# Patient Record
Sex: MALE | Race: WHITE
[De-identification: names, ages, dates, MRNs, and addresses within clinical notes are randomized per-mention and may not be internally consistent; named-entity substitution may affect disease eponyms.]

---
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
## 2021-01-20 ENCOUNTER — EMERGENCY (EMERGENCY)
Facility: HOSPITAL | Age: 21
LOS: 0 days | Discharge: HOME | End: 2021-01-20
Attending: PEDIATRICS | Admitting: PEDIATRICS
Payer: COMMERCIAL

## 2021-01-20 VITALS
SYSTOLIC BLOOD PRESSURE: 121 MMHG | DIASTOLIC BLOOD PRESSURE: 87 MMHG | TEMPERATURE: 99 F | RESPIRATION RATE: 20 BRPM | OXYGEN SATURATION: 88 % | HEART RATE: 78 BPM

## 2021-01-20 VITALS
HEART RATE: 108 BPM | RESPIRATION RATE: 18 BRPM | OXYGEN SATURATION: 100 % | DIASTOLIC BLOOD PRESSURE: 74 MMHG | TEMPERATURE: 209 F | SYSTOLIC BLOOD PRESSURE: 137 MMHG
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
## 2021-01-20 DIAGNOSIS — S09.90XA UNSPECIFIED INJURY OF HEAD, INITIAL ENCOUNTER: ICD-10-CM

## 2021-01-20 DIAGNOSIS — Z88.0 ALLERGY STATUS TO PENICILLIN: ICD-10-CM
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
## 2021-01-20 PROCEDURE — 99283 EMERGENCY DEPT VISIT LOW MDM: CPT

## 2021-01-20 RX ORDER — IBUPROFEN 200 MG
400 TABLET ORAL ONCE
Refills: 0 | Status: COMPLETED | OUTPATIENT
Start: 2021-01-20 | End: 2021-01-20

## 2021-01-20 NOTE — ED ADULT NURSE NOTE - PRO INTERPRETER NEED 2
Pt is a 45 y.o  male admitted with Acute Diverticulitis. Pt was alert, oriented and verified his demographic information and all is correct. Pt lives with his wife in a 1 story home with 3 steps to the entrance. Prior to admission, pt was independent with his ADL's and IADL's and drives. Pt stated he drove to the hospital from work. Pt stated he recently started a job and his insurance did not start yet. CM will contact [facility] regarding the care card. Pt stated his PCP retired. CM offered the [facility] PCP list. Denies using DME or having home health or rehab in the past. Preferred pharmacy is [facility].   CM will continue to follow for discharge planning needs. Care Management Interventions  PCP Verified by CM: Yes (doesn't have a PCP now )  Mode of Transport at Discharge:  Other (see comment) (friend or family )  Transition of Care Consult (CM Consult): Discharge Planning  Discharge Durable Medical Equipment: No  Physical Therapy Consult: No  Occupational Therapy Consult: No  Speech Therapy Consult: No  Current Support Network: Own Home, Lives with Spouse (lives in a 1 story home with 3 steps to the entrance)  Confirm Follow Up Transport: Family  Discharge Location  Discharge Placement: [address]

## 2021-01-20 NOTE — ED PROVIDER NOTE - PATIENT PORTAL LINK FT
You can access the FollowMyHealth Patient Portal offered by [facility] by registering at the following website: [email or web address]. By joining [employer]’s FollowMyHealth portal, you will also be able to view your health information using other applications (apps) compatible with our system.

## 2021-01-20 NOTE — ED PROVIDER NOTE - NS ED ROS FT
Eyes:  No visual changes, eye pain or discharge.  ENMT:  No hearing changes, pain, discharge or infections. No neck pain or stiffness. +head pain  Cardiac:  No chest pain, SOB or edema. No chest pain with exertion.  Respiratory:  No cough or respiratory distress. No hemoptysis. No history of asthma or RAD.  GI:  No nausea, vomiting, diarrhea or abdominal pain.  :  No dysuria, frequency or burning.  MS:  No myalgia, muscle weakness, joint pain or back pain.  Neuro:  No headache or weakness.  No LOC.  Skin:  No skin rash.

## 2021-01-20 NOTE — ED PROVIDER NOTE - PHYSICAL EXAMINATION
CONSTITUTIONAL: Well-developed; well-nourished; in no acute distress.   SKIN: warm, dry  HEAD: Normocephalic; no open wounds or laceration visualized, no visible hematoma, no skull depressions.  EYES: PERRL, EOMI, no conjunctival erythema  ENT: No nasal discharge; airway clear.  NECK: Supple; non tender.  CARD: S1, S2 normal; no murmurs, gallops, or rubs. Regular rate and rhythm.   RESP: No wheezes, rales or rhonchi.  ABD: soft ntnd  EXT: Normal ROM.  No clubbing, cyanosis or edema.   LYMPH: No acute cervical adenopathy.  NEURO: Alert, oriented, grossly unremarkable  PSYCH: Cooperative, appropriate.

## 2021-01-20 NOTE — ED PROVIDER NOTE - OBJECTIVE STATEMENT
19 y/o m with no pmh presenting for head injury. Per patient, he was walking down set up stairs when he fell and hit the L side of his head against railing. Occurred at 1pm today. Denies LOC, no n/v, no dizziness, no confusion, able to ambulate normally afterwards. Now with throbbing pain to the area since then.

## 2021-01-20 NOTE — ED PROVIDER NOTE - ATTENDING CONTRIBUTION TO CARE
I personally evaluated the patient. I reviewed the Resident’s note (as assigned above), and agree with the findings and plan except as documented in my note.  ~ 19 y/o male here for eval of headache after fall , no loc went to urgi but when told 2 hr wait came here ; injury occurred x 6.5 hrs ago no n/v   Gen: Alert, NAD, sitting comfortably in stretcher  Head: NC, AT, PERRL, EOMI, normal lids/conjunctiva  ENT: B TM WNL, patent oropharynx without erythema/exudate, uvula midline  Neck: +supple, no tenderness/meningismus/JVD, +Trachea midline  Pulm: Bilateral BS, normal resp effort, no wheeze/stridor/retractions  CV: RRR, no M/R/G, +dist pulses  Abd: soft, NT/ND, +BS, no hepatosplenomegaly  Mskel: no edema/erythema/cyanosis  Skin: no rash  Neuro: grossly intact

## 2022-02-03 ENCOUNTER — APPOINTMENT (OUTPATIENT)
Dept: OTOLARYNGOLOGY | Facility: CLINIC | Age: 22
End: 2022-02-03
Payer: COMMERCIAL

## 2022-02-03 VITALS — BODY MASS INDEX: 19.29 KG/M2 | HEIGHT: 66 IN | WEIGHT: 120 LBS

## 2022-02-03 PROBLEM — Z00.00 ENCOUNTER FOR PREVENTIVE HEALTH EXAMINATION: Status: ACTIVE | Noted: 2022-02-03

## 2022-02-03 PROCEDURE — 99203 OFFICE O/P NEW LOW 30 MIN: CPT

## 2022-02-03 NOTE — HISTORY OF PRESENT ILLNESS
[de-identified] : Patient presents today c/o  epidermal  cyst back of right ear .   Has a  cyst   behind   ear since November 2021 . Has reduced in size  . Has no pain at site .  Was seen  by derm and referred to Dr. Moncada. FNA revealed epidermal cyst. referred to our office for further management.

## 2022-02-03 NOTE — PHYSICAL EXAM
[Midline] : trachea located in midline position [de-identified] : palpable mass behind right auricle soft non tender approx 7 mm in size.  [Normal] : cranial nerves 2-12 intact

## 2022-02-03 NOTE — REASON FOR VISIT
[Initial Evaluation] : an initial evaluation for [FreeTextEntry2] : epidermal  cyst back of right ear

## 2022-05-19 ENCOUNTER — APPOINTMENT (OUTPATIENT)
Dept: OTOLARYNGOLOGY | Facility: CLINIC | Age: 22
End: 2022-05-19
Payer: COMMERCIAL

## 2022-05-19 DIAGNOSIS — H61.90 DISORDER OF EXTERNAL EAR, UNSPECIFIED, UNSPECIFIED EAR: ICD-10-CM

## 2022-05-19 PROCEDURE — 99212 OFFICE O/P EST SF 10 MIN: CPT

## 2022-05-19 NOTE — PHYSICAL EXAM
[Normal] : mucosa is normal [Midline] : trachea located in midline position [de-identified] : palpable mass behind right auricle soft non tender approx 5 mm in size.

## 2022-05-19 NOTE — HISTORY OF PRESENT ILLNESS
[FreeTextEntry1] : Patient returns today following up on lesion of external ear . Feels like cyst has  got  smaller , but still feels in in ear . No pain or  discomfort.

## 2022-11-14 ENCOUNTER — EMERGENCY (EMERGENCY)
Facility: HOSPITAL | Age: 22
LOS: 0 days | Discharge: HOME | End: 2022-11-15
Attending: EMERGENCY MEDICINE | Admitting: EMERGENCY MEDICINE

## 2022-11-14 VITALS
OXYGEN SATURATION: 100 % | SYSTOLIC BLOOD PRESSURE: 116 MMHG | HEART RATE: 93 BPM | RESPIRATION RATE: 18 BRPM | TEMPERATURE: 98 F | DIASTOLIC BLOOD PRESSURE: 68 MMHG

## 2022-11-14 VITALS
DIASTOLIC BLOOD PRESSURE: 78 MMHG | WEIGHT: 119.93 LBS | SYSTOLIC BLOOD PRESSURE: 167 MMHG | OXYGEN SATURATION: 99 % | TEMPERATURE: 98 F | RESPIRATION RATE: 18 BRPM | HEART RATE: 107 BPM
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
## 2022-11-14 DIAGNOSIS — R10.12 LEFT UPPER QUADRANT PAIN: ICD-10-CM
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
## 2022-11-14 DIAGNOSIS — Z88.0 ALLERGY STATUS TO PENICILLIN: ICD-10-CM
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
## 2022-11-14 DIAGNOSIS — R00.0 TACHYCARDIA, UNSPECIFIED: ICD-10-CM
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
## 2022-11-14 LAB
ALBUMIN SERPL ELPH-MCNC: 5.6 G/DL — HIGH (ref 3.5–5.2)
ALP SERPL-CCNC: 71 U/L — SIGNIFICANT CHANGE UP (ref 30–115)
ALT FLD-CCNC: 18 U/L — SIGNIFICANT CHANGE UP (ref 0–41)
ANION GAP SERPL CALC-SCNC: 8 MMOL/L — SIGNIFICANT CHANGE UP (ref 7–14)
APPEARANCE UR: CLEAR — SIGNIFICANT CHANGE UP
AST SERPL-CCNC: 21 U/L — SIGNIFICANT CHANGE UP (ref 0–41)
BASOPHILS # BLD AUTO: 0.03 K/UL — SIGNIFICANT CHANGE UP (ref 0–0.2)
BASOPHILS NFR BLD AUTO: 0.3 % — SIGNIFICANT CHANGE UP (ref 0–1)
BILIRUB SERPL-MCNC: 0.5 MG/DL — SIGNIFICANT CHANGE UP (ref 0.2–1.2)
BILIRUB UR-MCNC: NEGATIVE — SIGNIFICANT CHANGE UP
BUN SERPL-MCNC: 11 MG/DL — SIGNIFICANT CHANGE UP (ref 10–20)
CALCIUM SERPL-MCNC: 9.9 MG/DL — SIGNIFICANT CHANGE UP (ref 8.4–10.5)
CHLORIDE SERPL-SCNC: 99 MMOL/L — SIGNIFICANT CHANGE UP (ref 98–110)
CO2 SERPL-SCNC: 31 MMOL/L — SIGNIFICANT CHANGE UP (ref 17–32)
COLOR SPEC: COLORLESS — SIGNIFICANT CHANGE UP
CREAT SERPL-MCNC: 0.8 MG/DL — SIGNIFICANT CHANGE UP (ref 0.7–1.5)
DIFF PNL FLD: NEGATIVE — SIGNIFICANT CHANGE UP
EGFR: 128 ML/MIN/1.73M2 — SIGNIFICANT CHANGE UP
EOSINOPHIL # BLD AUTO: 0.01 K/UL — SIGNIFICANT CHANGE UP (ref 0–0.7)
EOSINOPHIL NFR BLD AUTO: 0.1 % — SIGNIFICANT CHANGE UP (ref 0–8)
GLUCOSE SERPL-MCNC: 99 MG/DL — SIGNIFICANT CHANGE UP (ref 70–99)
GLUCOSE UR QL: NEGATIVE — SIGNIFICANT CHANGE UP
HCT VFR BLD CALC: 49.8 % — SIGNIFICANT CHANGE UP (ref 42–52)
HGB BLD-MCNC: 16.8 G/DL — SIGNIFICANT CHANGE UP (ref 14–18)
IMM GRANULOCYTES NFR BLD AUTO: 0.2 % — SIGNIFICANT CHANGE UP (ref 0.1–0.3)
KETONES UR-MCNC: NEGATIVE — SIGNIFICANT CHANGE UP
LEUKOCYTE ESTERASE UR-ACNC: NEGATIVE — SIGNIFICANT CHANGE UP
LIDOCAIN IGE QN: 24 U/L — SIGNIFICANT CHANGE UP (ref 7–60)
LYMPHOCYTES # BLD AUTO: 1.84 K/UL — SIGNIFICANT CHANGE UP (ref 1.2–3.4)
LYMPHOCYTES # BLD AUTO: 19.2 % — LOW (ref 20.5–51.1)
MCHC RBC-ENTMCNC: 27.7 PG — SIGNIFICANT CHANGE UP (ref 27–31)
MCHC RBC-ENTMCNC: 33.7 G/DL — SIGNIFICANT CHANGE UP (ref 32–37)
MCV RBC AUTO: 82.2 FL — SIGNIFICANT CHANGE UP (ref 80–94)
MONOCYTES # BLD AUTO: 0.8 K/UL — HIGH (ref 0.1–0.6)
MONOCYTES NFR BLD AUTO: 8.4 % — SIGNIFICANT CHANGE UP (ref 1.7–9.3)
NEUTROPHILS # BLD AUTO: 6.86 K/UL — HIGH (ref 1.4–6.5)
NEUTROPHILS NFR BLD AUTO: 71.8 % — SIGNIFICANT CHANGE UP (ref 42.2–75.2)
NITRITE UR-MCNC: NEGATIVE — SIGNIFICANT CHANGE UP
NRBC # BLD: 0 /100 WBCS — SIGNIFICANT CHANGE UP (ref 0–0)
PH UR: 8 — SIGNIFICANT CHANGE UP (ref 5–8)
PLATELET # BLD AUTO: 207 K/UL — SIGNIFICANT CHANGE UP (ref 130–400)
POTASSIUM SERPL-MCNC: 4.4 MMOL/L — SIGNIFICANT CHANGE UP (ref 3.5–5)
POTASSIUM SERPL-SCNC: 4.4 MMOL/L — SIGNIFICANT CHANGE UP (ref 3.5–5)
PROT SERPL-MCNC: 7.6 G/DL — SIGNIFICANT CHANGE UP (ref 6–8)
PROT UR-MCNC: NEGATIVE — SIGNIFICANT CHANGE UP
RBC # BLD: 6.06 M/UL — SIGNIFICANT CHANGE UP (ref 4.7–6.1)
RBC # FLD: 11.9 % — SIGNIFICANT CHANGE UP (ref 11.5–14.5)
SODIUM SERPL-SCNC: 138 MMOL/L — SIGNIFICANT CHANGE UP (ref 135–146)
SP GR SPEC: 1.01 — SIGNIFICANT CHANGE UP (ref 1.01–1.03)
UROBILINOGEN FLD QL: SIGNIFICANT CHANGE UP
WBC # BLD: 9.56 K/UL — SIGNIFICANT CHANGE UP (ref 4.8–10.8)
WBC # FLD AUTO: 9.56 K/UL — SIGNIFICANT CHANGE UP (ref 4.8–10.8)

## 2022-11-14 PROCEDURE — 74177 CT ABD & PELVIS W/CONTRAST: CPT | Mod: 26,MA

## 2022-11-14 PROCEDURE — 99285 EMERGENCY DEPT VISIT HI MDM: CPT

## 2022-11-14 RX ORDER — ACETAMINOPHEN 500 MG
975 TABLET ORAL ONCE
Refills: 0 | Status: COMPLETED | OUTPATIENT
Start: 2022-11-14 | End: 2022-11-14

## 2022-11-14 RX ORDER — SODIUM CHLORIDE 9 MG/ML
1000 INJECTION, SOLUTION INTRAVENOUS ONCE
Refills: 0 | Status: COMPLETED | OUTPATIENT
Start: 2022-11-14 | End: 2022-11-14

## 2022-11-14 RX ADMIN — SODIUM CHLORIDE 1000 MILLILITER(S): 9 INJECTION, SOLUTION INTRAVENOUS at 21:48

## 2022-11-14 RX ADMIN — Medication 975 MILLIGRAM(S): at 21:48

## 2022-11-14 NOTE — ED PROVIDER NOTE - NSFOLLOWUPINSTRUCTIONS_ED_ALL_ED_FT
Musculoskeletal Pain    Musculoskeletal pain is muscle and mary kate aches and pains. These pains can occur in any part of the body. Your caregiver may treat you without knowing the cause of the pain. They may treat you if blood or urine tests, X-rays, and other tests were normal.     CAUSES  There is often not a definite cause or reason for these pains. These pains may be caused by a type of germ (virus). The discomfort may also come from overuse. Overuse includes working out too hard when your body is not fit. Mary Kate aches also come from weather changes. Bone is sensitive to atmospheric pressure changes.    HOME CARE INSTRUCTIONS  Ask when your test results will be ready. Make sure you get your test results.  Only take over-the-counter or prescription medicines for pain, discomfort, or fever as directed by your caregiver. If you were given medications for your condition, do not drive, operate machinery or power tools, or sign legal documents for 24 hours. Do not drink alcohol. Do not take sleeping pills or other medications that may interfere with treatment.  Continue all activities unless the activities cause more pain. When the pain lessens, slowly resume normal activities. Gradually increase the intensity and duration of the activities or exercise.   During periods of severe pain, bed rest may be helpful. Lay or sit in any position that is comfortable.   Putting ice on the injured area.  Put ice in a bag.   Place a towel between your skin and the bag.  Leave the ice on for 15 to 20 minutes, 3 to 4 times a day.   Follow up with your caregiver for continued problems and no reason can be found for the pain. If the pain becomes worse or does not go away, it may be necessary to repeat tests or do additional testing. Your caregiver may need to look further for a possible cause.    SEEK IMMEDIATE MEDICAL CARE IF:  You have pain that is getting worse and is not relieved by medications.  You develop chest pain that is associated with shortness or breath, sweating, feeling sick to your stomach (nauseous), or throw up (vomit).  Your pain becomes localized to the abdomen.  You develop any new symptoms that seem different or that concern you.    MAKE SURE YOU:  Understand these instructions.   Will watch your condition.  Will get help right away if you are not doing well or get worse.    ADDITIONAL NOTES AND INSTRUCTIONS    Please follow up with your Primary MD in 24-48 hr.  Seek immediate medical care for any new/worsening signs or symptoms.      Abdominal Pain    Many things can cause abdominal pain. Many times, abdominal pain is not caused by a disease and will improve without treatment. Your health care provider will do a physical exam to determine if there is a dangerous cause of your pain; blood tests and imaging may help determine the cause of your pain. However, in many cases, no cause may be found and you may need further testing as an outpatient. Monitor your abdominal pain for any changes.     SEEK IMMEDIATE MEDICAL CARE IF YOU HAVE ANY OF THE FOLLOWING SYMPTOMS: worsening abdominal pain, uncontrollable vomiting, profuse diarrhea, inability to have bowel movements or pass gas, black or bloody stools, fever accompanying chest pain or back pain, or fainting. These symptoms may represent a serious problem that is an emergency. Do not wait to see if the symptoms will go away. Get medical help right away. Call 911 and do not drive yourself to the hospital.

## 2022-11-14 NOTE — ED PROVIDER NOTE - CLINICAL SUMMARY MEDICAL DECISION MAKING FREE TEXT BOX
22yM p/w sharp LUQ abd pain, now improving.  Pt well appearing, mildly tachycardic on ED arrival but abd soft/benign.  Labs reassuring.  CT w/o acute pathology.  Suspect MSK strain after heavy lifting and pt is already much improved w/ only IV NS and tylenol in the ED.  Recommend supportive care, o/p f/u, return precautions.

## 2022-11-14 NOTE — ED PROVIDER NOTE - PHYSICAL EXAMINATION
CONSTITUTIONAL: Well-developed; well-nourished; in no acute distress.   SKIN: warm, dry. no rashes.  HEAD: Normocephalic; atraumatic.  EYES: PERRLA, EOMI, no conjunctival erythema.  ENT: No nasal discharge; airway clear. MMM.  NECK: Supple; non tender.  CARD: S1, S2 normal; no murmurs, gallops, or rubs. Regular rate and rhythm.   RESP: No wheezes, rales or rhonchi. lungs ctab.  ABD: soft, nd, (+) mild LUQ abdominal ttp. No masses, rebound, or guarding. No CVAT b/l.  EXT: Normal ROM.  No clubbing, cyanosis or edema. No calf ttp.  NEURO: Alert, oriented, grossly unremarkable. normal motor, strength, and sensation. no focal neurological deficits.  PSYCH: Cooperative, appropriate.

## 2022-11-14 NOTE — ED PROVIDER NOTE - ATTENDING CONTRIBUTION TO CARE
22yM otherwise healthy p/w abd pain - went to urgent care today for sharp pinpoint pain at LUQ and was sent to the ED to r/o splenic pathology.  Pt admits to recent heavy lifting (multiple boxes at work, heavy suitcases for his parents) and says pain has subsided to discomfort then basically nothing while waiting in the ED.  No associated fever, n/v/d, dysuria, CP, SOB.  No hx splenomegaly or other splenic problem.

## 2022-11-14 NOTE — ED PROVIDER NOTE - OBJECTIVE STATEMENT
22 year old male presenting to the ED for abdominal pain. Mainly localized to left upper quadrant of abdomen, states that it has been present since yesterday. Has been intermittent. States he also lifted a few heavy boxes at work yesterday. Denies any fevers/chills, n/v, diarrhea, cp, or sob. No urinary symptoms, no dysuria or hematuria. Went to urgent care today and was referred to the ED for further evaluation.

## 2022-11-14 NOTE — ED PROVIDER NOTE - PATIENT PORTAL LINK FT
You can access the FollowMyHealth Patient Portal offered by [facility] by registering at the following website: [email or web address]. By joining [employer]’s FollowMyHealth portal, you will also be able to view your health information using other applications (apps) compatible with our system.

## 2022-11-16 LAB
CULTURE RESULTS: SIGNIFICANT CHANGE UP
SPECIMEN SOURCE: SIGNIFICANT CHANGE UP

## 2023-05-30 NOTE — ED PROVIDER NOTE - INTERNATIONAL TRAVEL
No Unable to Assess Itraconazole Counseling:  I discussed with the patient the risks of itraconazole including but not limited to liver damage, nausea/vomiting, neuropathy, and severe allergy.  The patient understands that this medication is best absorbed when taken with acidic beverages such as non-diet cola or ginger ale.  The patient understands that monitoring is required including baseline LFTs and repeat LFTs at intervals.  The patient understands that they are to contact us or the primary physician if concerning signs are noted.

## 2023-10-06 ENCOUNTER — NON-APPOINTMENT (OUTPATIENT)
Age: 23
End: 2023-10-06

## 2024-02-18 ENCOUNTER — NON-APPOINTMENT (OUTPATIENT)
Age: 24
End: 2024-02-18

## 2024-03-01 ENCOUNTER — NON-APPOINTMENT (OUTPATIENT)
Age: 24
End: 2024-03-01

## 2024-03-02 ENCOUNTER — EMERGENCY (EMERGENCY)
Facility: HOSPITAL | Age: 24
LOS: 0 days | Discharge: LEFT BEFORE DISCHARGE | End: 2024-03-03
Attending: EMERGENCY MEDICINE
Payer: COMMERCIAL

## 2024-03-02 VITALS
RESPIRATION RATE: 18 BRPM | OXYGEN SATURATION: 100 % | WEIGHT: 125 LBS | SYSTOLIC BLOOD PRESSURE: 128 MMHG | DIASTOLIC BLOOD PRESSURE: 63 MMHG | HEART RATE: 120 BPM | TEMPERATURE: 100 F | HEIGHT: 66 IN

## 2024-03-02 DIAGNOSIS — R10.11 RIGHT UPPER QUADRANT PAIN: ICD-10-CM

## 2024-03-02 DIAGNOSIS — R50.9 FEVER, UNSPECIFIED: ICD-10-CM

## 2024-03-02 DIAGNOSIS — R09.81 NASAL CONGESTION: ICD-10-CM

## 2024-03-02 DIAGNOSIS — Z20.822 CONTACT WITH AND (SUSPECTED) EXPOSURE TO COVID-19: ICD-10-CM

## 2024-03-02 DIAGNOSIS — Z88.0 ALLERGY STATUS TO PENICILLIN: ICD-10-CM

## 2024-03-02 DIAGNOSIS — Z53.29 PROCEDURE AND TREATMENT NOT CARRIED OUT BECAUSE OF PATIENT'S DECISION FOR OTHER REASONS: ICD-10-CM

## 2024-03-02 DIAGNOSIS — R00.0 TACHYCARDIA, UNSPECIFIED: ICD-10-CM

## 2024-03-02 DIAGNOSIS — J06.9 ACUTE UPPER RESPIRATORY INFECTION, UNSPECIFIED: ICD-10-CM

## 2024-03-02 DIAGNOSIS — R63.0 ANOREXIA: ICD-10-CM

## 2024-03-02 DIAGNOSIS — R05.9 COUGH, UNSPECIFIED: ICD-10-CM

## 2024-03-02 LAB
ALBUMIN SERPL ELPH-MCNC: 4.2 G/DL — SIGNIFICANT CHANGE UP (ref 3.5–5.2)
ALP SERPL-CCNC: 72 U/L — SIGNIFICANT CHANGE UP (ref 30–115)
ALT FLD-CCNC: 43 U/L — HIGH (ref 0–41)
ANION GAP SERPL CALC-SCNC: 12 MMOL/L — SIGNIFICANT CHANGE UP (ref 7–14)
APPEARANCE UR: CLEAR — SIGNIFICANT CHANGE UP
AST SERPL-CCNC: 31 U/L — SIGNIFICANT CHANGE UP (ref 0–41)
BASOPHILS # BLD AUTO: 0.02 K/UL — SIGNIFICANT CHANGE UP (ref 0–0.2)
BASOPHILS NFR BLD AUTO: 0.2 % — SIGNIFICANT CHANGE UP (ref 0–1)
BILIRUB DIRECT SERPL-MCNC: <0.2 MG/DL — SIGNIFICANT CHANGE UP (ref 0–0.3)
BILIRUB INDIRECT FLD-MCNC: >0.1 MG/DL — LOW (ref 0.2–1.2)
BILIRUB SERPL-MCNC: 0.3 MG/DL — SIGNIFICANT CHANGE UP (ref 0.2–1.2)
BILIRUB UR-MCNC: NEGATIVE — SIGNIFICANT CHANGE UP
BUN SERPL-MCNC: 12 MG/DL — SIGNIFICANT CHANGE UP (ref 10–20)
CALCIUM SERPL-MCNC: 9.2 MG/DL — SIGNIFICANT CHANGE UP (ref 8.4–10.5)
CHLORIDE SERPL-SCNC: 100 MMOL/L — SIGNIFICANT CHANGE UP (ref 98–110)
CO2 SERPL-SCNC: 25 MMOL/L — SIGNIFICANT CHANGE UP (ref 17–32)
COLOR SPEC: YELLOW — SIGNIFICANT CHANGE UP
CREAT SERPL-MCNC: 0.8 MG/DL — SIGNIFICANT CHANGE UP (ref 0.7–1.5)
DIFF PNL FLD: NEGATIVE — SIGNIFICANT CHANGE UP
EGFR: 128 ML/MIN/1.73M2 — SIGNIFICANT CHANGE UP
EOSINOPHIL # BLD AUTO: 0 K/UL — SIGNIFICANT CHANGE UP (ref 0–0.7)
EOSINOPHIL NFR BLD AUTO: 0 % — SIGNIFICANT CHANGE UP (ref 0–8)
GLUCOSE SERPL-MCNC: 115 MG/DL — HIGH (ref 70–99)
GLUCOSE UR QL: NEGATIVE MG/DL — SIGNIFICANT CHANGE UP
HCT VFR BLD CALC: 43.5 % — SIGNIFICANT CHANGE UP (ref 42–52)
HGB BLD-MCNC: 14.8 G/DL — SIGNIFICANT CHANGE UP (ref 14–18)
IMM GRANULOCYTES NFR BLD AUTO: 0.4 % — HIGH (ref 0.1–0.3)
KETONES UR-MCNC: NEGATIVE MG/DL — SIGNIFICANT CHANGE UP
LACTATE SERPL-SCNC: 0.6 MMOL/L — LOW (ref 0.7–2)
LEUKOCYTE ESTERASE UR-ACNC: NEGATIVE — SIGNIFICANT CHANGE UP
LIDOCAIN IGE QN: 25 U/L — SIGNIFICANT CHANGE UP (ref 7–60)
LYMPHOCYTES # BLD AUTO: 0.75 K/UL — LOW (ref 1.2–3.4)
LYMPHOCYTES # BLD AUTO: 5.7 % — LOW (ref 20.5–51.1)
MCHC RBC-ENTMCNC: 28 PG — SIGNIFICANT CHANGE UP (ref 27–31)
MCHC RBC-ENTMCNC: 34 G/DL — SIGNIFICANT CHANGE UP (ref 32–37)
MCV RBC AUTO: 82.2 FL — SIGNIFICANT CHANGE UP (ref 80–94)
MONOCYTES # BLD AUTO: 1 K/UL — HIGH (ref 0.1–0.6)
MONOCYTES NFR BLD AUTO: 7.6 % — SIGNIFICANT CHANGE UP (ref 1.7–9.3)
NEUTROPHILS # BLD AUTO: 11.31 K/UL — HIGH (ref 1.4–6.5)
NEUTROPHILS NFR BLD AUTO: 86.1 % — HIGH (ref 42.2–75.2)
NITRITE UR-MCNC: NEGATIVE — SIGNIFICANT CHANGE UP
NRBC # BLD: 0 /100 WBCS — SIGNIFICANT CHANGE UP (ref 0–0)
PH UR: 6.5 — SIGNIFICANT CHANGE UP (ref 5–8)
PLATELET # BLD AUTO: 228 K/UL — SIGNIFICANT CHANGE UP (ref 130–400)
PMV BLD: 10 FL — SIGNIFICANT CHANGE UP (ref 7.4–10.4)
POTASSIUM SERPL-MCNC: 4.1 MMOL/L — SIGNIFICANT CHANGE UP (ref 3.5–5)
POTASSIUM SERPL-SCNC: 4.1 MMOL/L — SIGNIFICANT CHANGE UP (ref 3.5–5)
PROT SERPL-MCNC: 7.1 G/DL — SIGNIFICANT CHANGE UP (ref 6–8)
PROT UR-MCNC: NEGATIVE MG/DL — SIGNIFICANT CHANGE UP
RBC # BLD: 5.29 M/UL — SIGNIFICANT CHANGE UP (ref 4.7–6.1)
RBC # FLD: 12 % — SIGNIFICANT CHANGE UP (ref 11.5–14.5)
SODIUM SERPL-SCNC: 137 MMOL/L — SIGNIFICANT CHANGE UP (ref 135–146)
SP GR SPEC: 1.01 — SIGNIFICANT CHANGE UP (ref 1–1.03)
UROBILINOGEN FLD QL: 0.2 MG/DL — SIGNIFICANT CHANGE UP (ref 0.2–1)
WBC # BLD: 13.13 K/UL — HIGH (ref 4.8–10.8)
WBC # FLD AUTO: 13.13 K/UL — HIGH (ref 4.8–10.8)

## 2024-03-02 PROCEDURE — 93005 ELECTROCARDIOGRAM TRACING: CPT

## 2024-03-02 PROCEDURE — 76705 ECHO EXAM OF ABDOMEN: CPT

## 2024-03-02 PROCEDURE — 74177 CT ABD & PELVIS W/CONTRAST: CPT | Mod: MC

## 2024-03-02 PROCEDURE — 0225U NFCT DS DNA&RNA 21 SARSCOV2: CPT

## 2024-03-02 PROCEDURE — 93010 ELECTROCARDIOGRAM REPORT: CPT

## 2024-03-02 PROCEDURE — 85025 COMPLETE CBC W/AUTO DIFF WBC: CPT

## 2024-03-02 PROCEDURE — 71046 X-RAY EXAM CHEST 2 VIEWS: CPT

## 2024-03-02 PROCEDURE — 71046 X-RAY EXAM CHEST 2 VIEWS: CPT | Mod: 26

## 2024-03-02 PROCEDURE — 83690 ASSAY OF LIPASE: CPT

## 2024-03-02 PROCEDURE — 36415 COLL VENOUS BLD VENIPUNCTURE: CPT

## 2024-03-02 PROCEDURE — 81003 URINALYSIS AUTO W/O SCOPE: CPT

## 2024-03-02 PROCEDURE — 87086 URINE CULTURE/COLONY COUNT: CPT

## 2024-03-02 PROCEDURE — 80048 BASIC METABOLIC PNL TOTAL CA: CPT

## 2024-03-02 PROCEDURE — 83605 ASSAY OF LACTIC ACID: CPT

## 2024-03-02 PROCEDURE — 80076 HEPATIC FUNCTION PANEL: CPT

## 2024-03-02 PROCEDURE — 99285 EMERGENCY DEPT VISIT HI MDM: CPT | Mod: 25

## 2024-03-02 PROCEDURE — 96374 THER/PROPH/DIAG INJ IV PUSH: CPT | Mod: XU

## 2024-03-02 PROCEDURE — 99285 EMERGENCY DEPT VISIT HI MDM: CPT

## 2024-03-02 RX ORDER — IBUPROFEN 200 MG
600 TABLET ORAL ONCE
Refills: 0 | Status: COMPLETED | OUTPATIENT
Start: 2024-03-02 | End: 2024-03-02

## 2024-03-02 RX ORDER — SODIUM CHLORIDE 9 MG/ML
1000 INJECTION INTRAMUSCULAR; INTRAVENOUS; SUBCUTANEOUS ONCE
Refills: 0 | Status: COMPLETED | OUTPATIENT
Start: 2024-03-02 | End: 2024-03-02

## 2024-03-02 RX ORDER — IOHEXOL 300 MG/ML
30 INJECTION, SOLUTION INTRAVENOUS ONCE
Refills: 0 | Status: COMPLETED | OUTPATIENT
Start: 2024-03-02 | End: 2024-03-02

## 2024-03-02 RX ORDER — SODIUM CHLORIDE 9 MG/ML
1000 INJECTION, SOLUTION INTRAVENOUS ONCE
Refills: 0 | Status: COMPLETED | OUTPATIENT
Start: 2024-03-02 | End: 2024-03-02

## 2024-03-02 RX ADMIN — IOHEXOL 30 MILLILITER(S): 300 INJECTION, SOLUTION INTRAVENOUS at 22:22

## 2024-03-02 RX ADMIN — SODIUM CHLORIDE 1000 MILLILITER(S): 9 INJECTION, SOLUTION INTRAVENOUS at 22:23

## 2024-03-02 RX ADMIN — Medication 600 MILLIGRAM(S): at 20:39

## 2024-03-02 RX ADMIN — SODIUM CHLORIDE 1000 MILLILITER(S): 9 INJECTION INTRAMUSCULAR; INTRAVENOUS; SUBCUTANEOUS at 20:40

## 2024-03-02 NOTE — ED PROVIDER NOTE - PHYSICAL EXAMINATION
CONSTITUTIONAL: Well-appearing; well-nourished; in no apparent distress.   EYES: PERRL; EOM intact.   ENT: normal nose; no rhinorrhea; normal pharynx with no tonsillar hypertrophy.   NECK: Supple; non-tender; no cervical lymphadenopathy.   CARDIOVASCULAR: Normal S1, S2; no murmurs, rubs, or gallops.   RESPIRATORY: Normal chest excursion with respiration; breath sounds clear and equal bilaterally; no wheezes, rhonchi, or rales.  GI/: Normal bowel sounds; non-distended; non-tender; no palpable organomegaly. neg murphys sign  MS: No evidence of trauma or deformity. Normal ROM in all four extremities; non-tender to palpation; distal pulses are normal.   SKIN: Normal for age and race; warm; dry; good turgor; no apparent lesions or exudate.   NEURO/PSYCH: A & O x 4; grossly unremarkable. mood and manner are appropriate. Grooming and personal hygiene are appropriate.

## 2024-03-02 NOTE — ED PROVIDER NOTE - ATTENDING APP SHARED VISIT CONTRIBUTION OF CARE
22-year-old man with no significant PMHx, in ER with complaint of not feeling well for the past 1-2 weeks.  + Nasal congestion/rhinorrhea, + cough, + decreased p.o. intake.  Has been having intermittent temperature, was 102.4 yesterday.  + Sick contacts in household with same symptoms.  Patient states has been having right-sided abdominal pain for the past 2 days -pain to RUQ/R mid abdomen.  Denies any N/V/D.  No urinary symptoms.  No CP/SOB. Pt was seen at an C 1 day ago and given rx for doxycycline.   PE - nad, nc/at, eomi, perrl, op - clear, mmm, no pharyngeal erythema, neck supple, cta b/l, no w/r/r, tachy 110's, regular, abd- soft, nd, + mild R abdominal tenderness, no guarding/rebound, no rash to area, nabs, no CVAT,  from x 4, no LE swelling/tenderness, A&O x 3, cn 2-12 intact, no focal motor/sensory deficits.   -ivf, check labs, nasal swab, cxr, CT abd

## 2024-03-02 NOTE — ED ADULT TRIAGE NOTE - PATIENT ON (OXYGEN DELIVERY METHOD)
No fever. Coughing and congestion. Advised saline and bulb suction. Continue treatments every 6 hours. Er if fever or apt on Monday if worsens.   
room air

## 2024-03-02 NOTE — ED PROVIDER NOTE - PROGRESS NOTE DETAILS
Labs reviewed: WBC 13, CMP unremarkable, lipase 25, lactate negative.  UA negative.  CXR negative.  RUQ sono normal gallbladder, 6 mm nonobstructing R renal stone.  CT abdomen: Negative.  Patient given IVF, pain meds, on reevaluation remains tachycardic with -120.  Recommended patient to get further evaluation for persistent tachycardia, however he is feeling better and does not want to stay in the ER any longer for further evaluation.  He wants to go home, states he will follow-up with his PMD as an outpatient.  Patient told that his workup was incomplete, and that leaving could result in death or permanent disability.  Patient understands but still wants to go, to sign out AMA.  Patient told he can return to ER at any time to continue his evaluation.  Patient understands agrees with plan.

## 2024-03-02 NOTE — ED PROVIDER NOTE - PATIENT PORTAL LINK FT
You can access the FollowMyHealth Patient Portal offered by [facility] by registering at the following website: [email or web address]. By joining [employer]’s FollowMyHealth portal, you will also be able to view your health information using other applications (apps) compatible with our system.

## 2024-03-02 NOTE — ED PROVIDER NOTE - OBJECTIVE STATEMENT
23 year old M denies pmhx, non smoker presenting to er for eval. Pt sts for the past two weeks has had intermittent fever tmax 102.4 yesterday, chills, nasal congestion, cough, decreased appetite. Pt sts everyone is his household is sick with similar symptoms. Pt sts for the past 2 days has had ruq pain described as a discomfort that is worse at night. Pain is non radiating, non pleuritic. No chest pain, sob, urinary symptoms, vomiting, diarrhea, past intraabdominal surgeries. 23 year old M denies pmhx, non smoker presenting to er for eval. Pt sts for the past two weeks has had intermittent fever tmax 102.4 yesterday, chills, nasal congestion, cough, decreased appetite. Pt sts everyone is his household is sick with similar symptoms. Pt sts for the past 2 days has had ruq pain described as a discomfort that is worse at night. Pain is non radiating, non pleuritic. Pt was seen at  today had neg cxr, flu/covid swab and was started on Doxy. No chest pain, sob, urinary symptoms, vomiting, diarrhea, past intraabdominal surgeries.

## 2024-03-02 NOTE — ED PROVIDER NOTE - NSFOLLOWUPINSTRUCTIONS_ED_ALL_ED_FT
Please follow up with your primary care physician. Return to the emergency department if your symptoms do not resolve and/or worsen. If you've been prescribed medications, please take your medications as prescribed.    Viral Respiratory Infection    A viral respiratory infection is an illness that affects parts of the body used for breathing, like the lungs, nose, and throat. It is caused by a germ called a virus. Symptoms can include runny nose, coughing, sneezing, fatigue, body aches, sore throat, fever, or headache. Over the counter medicine can be used to manage the symptoms but the infection typically goes away on its own in 5 to 10 days.     SEEK IMMEDIATE MEDICAL CARE IF YOU HAVE ANY OF THE FOLLOWING SYMPTOMS: shortness of breath, chest pain, fever over 10 days, or lightheadedness/dizziness.

## 2024-03-02 NOTE — ED PROVIDER NOTE - CONSIDERATION OF ADMISSION OBSERVATION
Recommended for patient to be admitted due to persistent tachycardia, however he does not want to stay in hospital.  Signed out AMA. Consideration of Admission/Observation

## 2024-03-03 VITALS — HEART RATE: 115 BPM

## 2024-03-03 LAB
HADV DNA SPEC QL NAA+PROBE: DETECTED
RAPID RVP RESULT: DETECTED
SARS-COV-2 RNA SPEC QL NAA+PROBE: SIGNIFICANT CHANGE UP

## 2024-03-03 PROCEDURE — 74177 CT ABD & PELVIS W/CONTRAST: CPT | Mod: 26,MC

## 2024-03-03 PROCEDURE — 76705 ECHO EXAM OF ABDOMEN: CPT | Mod: 26

## 2024-03-03 RX ORDER — KETOROLAC TROMETHAMINE 30 MG/ML
15 SYRINGE (ML) INJECTION ONCE
Refills: 0 | Status: DISCONTINUED | OUTPATIENT
Start: 2024-03-03 | End: 2024-03-03

## 2024-03-03 RX ORDER — ACETAMINOPHEN 500 MG
975 TABLET ORAL ONCE
Refills: 0 | Status: COMPLETED | OUTPATIENT
Start: 2024-03-03 | End: 2024-03-03

## 2024-03-03 RX ORDER — SODIUM CHLORIDE 9 MG/ML
1000 INJECTION, SOLUTION INTRAVENOUS ONCE
Refills: 0 | Status: COMPLETED | OUTPATIENT
Start: 2024-03-03 | End: 2024-03-03

## 2024-03-03 RX ADMIN — SODIUM CHLORIDE 1000 MILLILITER(S): 9 INJECTION, SOLUTION INTRAVENOUS at 03:18

## 2024-03-03 RX ADMIN — Medication 15 MILLIGRAM(S): at 03:18

## 2024-03-03 RX ADMIN — Medication 975 MILLIGRAM(S): at 03:20

## 2024-03-04 LAB
CULTURE RESULTS: SIGNIFICANT CHANGE UP
SPECIMEN SOURCE: SIGNIFICANT CHANGE UP

## 2024-03-07 NOTE — ED ADULT TRIAGE NOTE - AS HEIGHT TYPE
.                                                        [facility] INFECTIOUS DISEASE PROGRESS NOTE    Isiah Navarro Patient Status:  Observation    1982 MRN [id number]   Location [facility] [address] Attending Heber Salazar MD   Hosp Day # 0 PCP Pcp, No       ANTIMICROBIAL THERAPY:   KEFLEX, DOXY (3/4-3/6)  Unasyn 3/6--  Vancomycin 3/6-    Subjective:  Patient seen and examined.  Chart reviewed.  Patient lying in bed.  He was unable to stand on his feet because of the pain to his right leg.  Right wound still draining significant amount of purulent drainage.  No fevers Tmax 99.9.  Denies any nausea/vomiting/diarrhea.  WBC count down to 9.6K.    Impression:  Patient Active Problem List   Diagnosis    Cellulitis and abscess of leg       ASSESSMENT:    # Right pretibial abscess with associated cellulitis.  Initially started as \"spider bite\".  Consideration for MRSA.  Did not respond to Keflex or doxycycline.  Otherwise consideration for gram-negative process.  Would seem less likely atypical mycobacterial process, etc.  With reported history of Crohn's disease but not clear how or when diagnosed.  Over 10 years ago.  Has not been on any Biologics.  Crohn's associated inflammatory process with less likely although not impossible.    # Crohn's disease.  Diagnosed 10 years ago.  Occasional flare.  Has not had a flare in some time.  Has not been on Biologics.  Diagnosed in [place].  Does not have a gastroenterologist.    PLAN:  -Continue vancomycin and Unasyn for now.  -Follow blood cultures, no growth to date  -Obtain wound cultures  -Monitor the area of leg swelling.  -Obtain CT right leg with contrast  -Consider surgical consult for evaluation of abscess  -Monitor WBC count fever curve  -Continue supportive care    Discussed with patient and bedside RN  Discussed with ID attending, Dr. Duran  We will continue to follow.    History of Present Illness:  Isiah Navarro is a a(n) 41 year old male.  Who is  currently at a rehab facility presenting with a week and a half of worsening leg swelling.  He starts as a spider bite.  He states he had a previous spider bite similarly when he was living in [place].  He asked the facility to be evaluated but they gave him some Benadryl for a few days.  He the area of erythema grossly was eventually evaluated in the ED on March 4.  At that time, he had area drained.  We do not have cultures at that point.  He was started on Keflex and doxycycline.  Went back to facility.  Had increased pain and drainage so he returned to the ED as he was instructed.  No fever here.  White blood cell count 13.8.    No known trauma to the pretibial area.  He has never had history of MRSA to his knowledge.    He has a history of Crohn's disease.  He states he was diagnosed 10 years ago.  While in [place].  He has never been on biologic agents.  Has 3 watery bowel movements per day which is baseline for him.  No known    Allergies:  ALLERGIES:   Allergen Reactions    Blackberry   (Food Or Med) ANAPHYLAXIS    Blueberries [Blueberry   (Food Or Med)] ANAPHYLAXIS    Horseradish   (Food) ANAPHYLAXIS       Medications:  Current Facility-Administered Medications   Medication Dose Route Frequency Provider Last Rate Last Admin    ampicillin-sulbactam (UNASYN) 3 g in sodium chloride 0.9 % 100 mL IVPB  3 g Intravenous 4 times per day Mahad Duran  mL/hr at 03/07/24 0603 3 g at 03/07/24 0603    VANCOMYCIN - PHARMACIST MONITORED Misc   Does not apply See Admin Instructions Mahad Duran MD        vancomycin (VANCOCIN) 1,000 mg in sodium chloride 0.9 % 250 mL IVPB  1,000 mg Intravenous 2 times per day Mahad Duran .7 mL/hr at 03/07/24 0704 1,000 mg at 03/07/24 0704    ondansetron (ZOFRAN ODT) disintegrating tablet 4 mg  4 mg Oral Q12H PRN Heber Salazar MD        Or    ondansetron (ZOFRAN) injection 4 mg  4 mg Intravenous Q12H PRN Heber Salazar MD        polyethylene glycol (MIRALAX)  packet 17 g  17 g Oral Daily PRN Heber Salazar MD        Potassium Standard Replacement Protocol (Levels 3.5 and lower)   Does not apply See Admin Instructions Heber Salazar MD        Magnesium Standard Replacement Protocol   Does not apply See Admin Instructions Heber Salazar MD        sodium chloride 0.9 % flush bag 25 mL  25 mL Intravenous PRN Heber Salazar MD 25 mL/hr at 03/07/24 0703 25 mL at 03/07/24 0703    sodium chloride 0.9 % injection 2 mL  2 mL Intracatheter 2 times per day Heber Salazar MD   2 mL at 03/06/24 2158    enoxaparin (LOVENOX) injection 40 mg  40 mg Subcutaneous Daily Heber Salazar MD   40 mg at 03/07/24 0842    ketorolac (TORADOL) injection 15 mg  15 mg Intravenous Q6H PRN Heber Salazar MD        docusate sodium-sennosides (SENOKOT S) 50-8.6 MG 2 tablet  2 tablet Oral BID PRN Heber Salazar MD        bisacodyl (DULCOLAX) suppository 10 mg  10 mg Rectal Daily PRN Heber Salazar MD        magnesium hydroxide (MILK OF MAGNESIA) 400 MG/5ML suspension 30 mL  30 mL Oral Daily PRN Heber Salazar MD        melatonin tablet 6 mg  6 mg Oral Nightly PRN Heber Salazar MD        cyclobenzaprine (FLEXERIL) tablet 10 mg  10 mg Oral TID PRN Heber Salazar MD        sodium chloride 0.9% infusion   Intravenous Continuous Heber Salazar  mL/hr at 03/07/24 0850 New Bag at 03/07/24 0850    acetaminophen (TYLENOL) tablet 650 mg  650 mg Oral Q6H PRN Heber Salazar MD        aluminum-magnesium hydroxide-simethicone (MAALOX) 200-200-20 MG/5ML suspension 30 mL  30 mL Oral 4x Daily PRN Heber Salazar MD        busPIRone (BUSPAR) tablet 30 mg  30 mg Oral BID Heber Salazar MD   30 mg at 03/07/24 0842    ondansetron (ZOFRAN ODT) disintegrating tablet 4 mg  4 mg Oral Q6H PRN Heber Salazar MD        lamoTRIgine (LaMICtal) tablet 100 mg  100 mg Oral Daily Heber Salazar MD   100 mg at 03/07/24 0842    sertraline (ZOLOFT) tablet 100 mg  100 mg Oral Daily Heber Salazar MD    100 mg at 03/07/24 0842    traZODone (DESYREL) tablet 100 mg  100 mg Oral Nightly Heber Salazar MD   100 mg at 03/06/24 2156    hydrALAZINE (APRESOLINE) tablet 50 mg  50 mg Oral Q8H PRN Heber Salazar MD        hydrALAZINE (APRESOLINE) injection 10 mg  10 mg Intravenous Q4H PRN Heber Salazar MD        HYDROcodone-acetaminophen (NORCO) 5-325 MG per tablet 1 tablet  1 tablet Oral Q4H PRN Heber Salazar MD        traMADol (ULTRAM) tablet 50 mg  50 mg Oral Q6H PRN Heber Salazar MD        albuterol (VENTOLIN) nebulizer 2.5 mg  2.5 mg Nebulization Q4H Resp PRN Heber Salazar MD        guaiFENesin-DM (MUCINEX DM) 600-30 mg ER tablet 1 tablet  1 tablet Oral Q12H PRN Heber Salazar MD              Physical Exam:    General: No acute distress. Alert and oriented x 3.  Vital signs: Blood pressure 114/64, pulse 77, temperature 98.2 °F (36.8 °C), temperature source Oral, resp. rate 16, height 5' 10\" (1.778 m), weight 85.7 kg (188 lb 15 oz), SpO2 99 %.  HEENT: Moist mucous membranes. Extraocular muscles are intact.  Neck: No lymphadenopathy.  No JVD. No carotid bruits.  Respiratory: Clear to auscultation bilaterally.  No wheezes. No rhonchi.  Cardiovascular: S1, S2.  Regular rate and rhythm.  No murmurs.  Abdomen: Soft, nontender, nondistended.  Positive bowel sounds.  Musculoskeletal: Purulent drainage from a pretibial wound on the right leg.  Erythema surrounding this but no crepitus.  No synovitis of the knee and ankle joint.  Some slight increase in swelling around the ankle.  No lymphangitis into the thigh area.  Integument: No other lesions on bilateral pretibial area to consider something like erythema nodosum.    Laboratory Data:  Recent Labs   Lab 03/07/24  0723 03/06/24  2140 03/06/24  2139 03/06/24  1543   WBC 9.6  --  13.1* 13.8*   HGB 11.1*  --  11.4* 12.9*   HCT 35.1*  --  35.6* 40.1   MCV 87.8  --  87.3 87.7     --  318 352   SODIUM 138 135  --  135   POTASSIUM 4.0 3.6  --  3.9   CHLORIDE  107 103  --  102   CO2 30 27  --  29   BUN 11 16  --  12   CREATININE 0.90 1.08  --  1.00   GLUCOSE 113* 136*  --  92   CALCIUM 8.4 8.5  --  9.4   ALBUMIN 2.3* 2.4*  --  2.9*   AST 16 15  --  16   GPT 16 16  --  17   BILIRUBIN 0.4 0.4  --  0.6   INR  --   --  1.0  --        Microbiologic Data:   (this admission)  Microbiology Results       None             Imaging:  Reviewed imaging.           MATTHEW Beth [facility], [facility]  [phone]  3/7/2024  12:23 PM    stated